# Patient Record
Sex: MALE | Race: OTHER
[De-identification: names, ages, dates, MRNs, and addresses within clinical notes are randomized per-mention and may not be internally consistent; named-entity substitution may affect disease eponyms.]

---

## 2019-07-31 ENCOUNTER — HOSPITAL ENCOUNTER (EMERGENCY)
Dept: HOSPITAL 25 - UCCORT | Age: 19
Discharge: HOME | End: 2019-07-31
Payer: COMMERCIAL

## 2019-07-31 VITALS — DIASTOLIC BLOOD PRESSURE: 73 MMHG | SYSTOLIC BLOOD PRESSURE: 121 MMHG

## 2019-07-31 DIAGNOSIS — Z87.891: ICD-10-CM

## 2019-07-31 DIAGNOSIS — Z88.0: ICD-10-CM

## 2019-07-31 DIAGNOSIS — H10.31: Primary | ICD-10-CM

## 2019-07-31 PROCEDURE — G0463 HOSPITAL OUTPT CLINIC VISIT: HCPCS

## 2019-07-31 PROCEDURE — 99202 OFFICE O/P NEW SF 15 MIN: CPT

## 2019-07-31 NOTE — UC
Eye Complaint HPI





- HPI Summary


HPI Summary: 


Per RN triage: "Right eye itching and redness started 2 days ago, 


watery; no injury no FB. Head congestion and sneezing for one week"


-no coughing, no fevers/chills. 


-no photophobia. just blurry vision form dc. 





- History of Current Complaint


Chief Complaint: UCEye


Stated Complaint: RIGHT EYE CONCERN


Time Seen by Provider: 07/31/19 17:01


Pain Intensity: 0





- Allergies/Home Medications


Allergies/Adverse Reactions: 


 Allergies











Allergy/AdvReac Type Severity Reaction Status Date / Time


 


amoxicillin Allergy  Unknown Verified 07/31/19 16:56





   Reaction  





   Details  


 


Penicillins Allergy  Unknown Verified 07/31/19 16:56





   Reaction  





   Details  














PMH/Surg Hx/FS Hx/Imm Hx


Previously Healthy: Yes





- Surgical History


Surgical History: None





- Family History


Known Family History: Positive: Hypertension





- Social History


Alcohol Use: Occasionally


Substance Use Type: None


Smoking Status (MU): Former Smoker





Review of Systems


All Other Systems Reviewed And Are Negative: Yes


Constitutional: Positive: Negative


Skin: Positive: Negative


Eyes: Positive: Blurred Vision, Drainage, Eye Redness.  Negative: Diplopia, 

Photophobia


ENT: Positive: Nasal Discharge


Respiratory: Positive: Negative.  Negative: Shortness Of Breath, Cough


Cardiovascular: Positive: Negative.  Negative: Palpitations, Chest Pain


Gastrointestinal: Positive: Negative


Motor: Positive: Negative


Neurovascular: Positive: Negative


Musculoskeletal: Positive: Negative


Neurological: Positive: Negative


Psychological: Positive: Negative


Is Patient Immunocompromised?: No





Physical Exam


Triage Information Reviewed: Yes


Appearance: Well-Appearing, No Pain Distress, Well-Nourished - very pleasant


Vital Signs: 


 Initial Vital Signs











Temp  98.2 F   07/31/19 16:58


 


Pulse  71   07/31/19 16:58


 


Resp  15   07/31/19 16:58


 


BP  121/73   07/31/19 16:58


 


Pulse Ox  100   07/31/19 16:58











Vital Signs Reviewed: Yes


Eyes: Positive: Discharge - mild, clear dc. mild conjunctival injection. no 

icterus. EOMI, PERRL.


ENT Exam: Normal


ENT: Positive: Pharynx normal, TMs normal.  Negative: TM bulging, TM dull, TM 

red, Sinus tenderness


Neck exam: Normal


Neck: Positive: Supple, Nontender, No Lymphadenopathy


Respiratory Exam: Normal


Respiratory: Positive: Lungs clear, Normal breath sounds, No respiratory 

distress, No accessory muscle use.  Negative: Crackles, Rhonchi, Stridor


Cardiovascular Exam: Normal


Abdominal Exam: Normal


Musculoskeletal Exam: Normal


Neurological Exam: Normal


Psychological Exam: Normal


Skin Exam: Normal





Eye Complaint Course/Dx





- Differential Dx/Diagnosis


Differential Diagnosis/HQI/PQRI: Conjunctivitis


Provider Diagnosis: 


 Conjunctivitis








Discharge





- Sign-Out/Discharge


Documenting (check all that apply): Patient Departure


All imaging exams completed and their final reports reviewed: No Studies





- Discharge Plan


Condition: Stable


Disposition: HOME


Prescriptions: 


Gentamicin 0.3% OPHTH.SOLN* 1 drop RIGHT EYE Q4H 5 Days #1 btl


Patient Education Materials:  Conjunctivitis (ED)


Referrals: 


Ricardo Shah MD [Primary Care Provider] - If Needed


Additional Instructions: 


You can follow up with your PCP if your symptoms increase or persist. Cold 

compresses can help your symptoms. 





- Billing Disposition and Condition


Condition: STABLE


Disposition: Home

## 2020-02-18 ENCOUNTER — HOSPITAL ENCOUNTER (EMERGENCY)
Dept: HOSPITAL 25 - UCCORT | Age: 20
Discharge: HOME | End: 2020-02-18
Payer: SELF-PAY

## 2020-02-18 VITALS — DIASTOLIC BLOOD PRESSURE: 69 MMHG | SYSTOLIC BLOOD PRESSURE: 128 MMHG

## 2020-02-18 DIAGNOSIS — Z87.891: ICD-10-CM

## 2020-02-18 DIAGNOSIS — Z88.0: ICD-10-CM

## 2020-02-18 DIAGNOSIS — J02.9: Primary | ICD-10-CM

## 2020-02-18 PROCEDURE — G0463 HOSPITAL OUTPT CLINIC VISIT: HCPCS

## 2020-02-18 PROCEDURE — 99211 OFF/OP EST MAY X REQ PHY/QHP: CPT

## 2020-02-18 PROCEDURE — 87651 STREP A DNA AMP PROBE: CPT

## 2020-02-18 NOTE — UC
Throat Pain/Nasal Deny HPI





- HPI Summary


HPI Summary: 





19-year-old male who has had a sore throat for approximately one week.  He had 

cold symptoms last week with fever however that has resolved.  He states he 

mostly has a sore throat in the morning after awakening.  He denies any fever 

or chills this week.





- History of Current Complaint


Chief Complaint: UCEar


Stated Complaint: SORE THROAT


Time Seen by Provider: 02/18/20 20:10


Hx Obtained From: Patient


Onset/Duration: Gradual Onset


Severity: Mild


Pain Intensity: 7


Cough: None





- Allergies/Home Medications


Allergies/Adverse Reactions: 


 Allergies











Allergy/AdvReac Type Severity Reaction Status Date / Time


 


amoxicillin Allergy  Unknown Verified 02/18/20 20:07





   Reaction  





   Details  


 


Penicillins Allergy  Unknown Verified 02/18/20 20:07





   Reaction  





   Details  











Home Medications: 


 Home Medications





NK [No Home Medications Reported]  02/18/20 [History Confirmed 02/18/20]











PMH/Surg Hx/FS Hx/Imm Hx


Previously Healthy: Yes





- Surgical History


Surgical History: None





- Family History


Known Family History: Positive: Hypertension





- Social History


Alcohol Use: Occasionally


Substance Use Type: None


Smoking Status (MU): Former Smoker





Review of Systems


All Other Systems Reviewed And Are Negative: Yes


Constitutional: Positive: Fever - Fever last week which has resolved this week.


ENT: Positive: Sore Throat - Patient has a mild sore throat which he states is 

worse in the morning on awakening.


Is Patient Immunocompromised?: No





Physical Exam


Triage Information Reviewed: Yes


Appearance: Well-Appearing, No Pain Distress, Well-Nourished


Vital Signs: 


 Initial Vital Signs











Temp  99.1 F   02/18/20 20:05


 


Pulse  80   02/18/20 20:05


 


Resp  14   02/18/20 20:05


 


BP  128/69   02/18/20 20:05


 


Pulse Ox  97   02/18/20 20:05











Vital Signs Reviewed: Yes


Eyes: Positive: Conjunctiva Clear


ENT: Positive: Pharynx normal, TMs normal, Uvula midline


Neck: Positive: Supple, Nontender, No Lymphadenopathy


Respiratory: Positive: Lungs clear, Normal breath sounds, No respiratory 

distress, No accessory muscle use


Cardiovascular: Positive: RRR, No Murmur, Pulses Normal, Brisk Capillary Refill


Musculoskeletal Exam: Normal


Neurological Exam: Normal


Psychological Exam: Normal


Skin Exam: Normal





Throat Pain/Nasal Course/Dx





- Course


Course Of Treatment: 





Rapid strep test: Negative





Patient is comfortable here and does not appear ill.





- Differential Dx/Diagnosis


Provider Diagnosis: 


 Pharyngitis








Discharge ED





- Sign-Out/Discharge


Documenting (check all that apply): Patient Departure


All imaging exams completed and their final reports reviewed: No Studies





- Discharge Plan


Condition: Good


Disposition: HOME


Patient Education Materials:  Pharyngitis (ED)


Referrals: 


Ricardo Shah MD [Primary Care Provider] - 


Additional Instructions: 


Increase fluids, warm saltwater gargles, throat lozenges as needed for comfort.

  Definite follow-up with your primary care provider if no improvement in 5-7 

days.





- Billing Disposition and Condition


Condition: GOOD


Disposition: Home





- Attestation Statements


Provider Attestation: 





This patient was not seen by me.


I was available for consult.


Chart reviewed.


[id number]